# Patient Record
(demographics unavailable — no encounter records)

---

## 2024-10-11 NOTE — HISTORY OF PRESENT ILLNESS
[FreeTextEntry1] : NPV - spot [de-identified] : 39 y/o M here for skin check #itchy scalp #recurrent jock itch - pt uses OTC antifungal spray with resolution, notes in hot months, not active today hx of blistering sunburns when younger uses sunscreen when he goes out no PMH or FH skin cancer social hx: works for Dr[employer]

## 2024-10-11 NOTE — PHYSICAL EXAM
[FreeTextEntry3] : PE:  General: well-appearing, alert, in no acute distress  Full body skin exam performed examining scalp, head, face, ears, eyes, mouth, neck, chest, back, abdomen, axilla, buttock, b/l arms, b/l forearms, b/l hands, b/l fingernails, b/l thighs, b/l legs, b/l feet, b/l toenails. Groin and genitalia deferred per patient. Pertinent findings include: Fairly uniform and regular brown macules and papules on the trunk and extremities. dermoscopy with benign features Scattered well demarcated stuck on appearing brown plaques on the trunk and extremities. mild scale in scalp pt deferred groin exam

## 2024-10-11 NOTE — ASSESSMENT
[FreeTextEntry1] : #Seborrheic dermatitis, scalp, chronic, flaring We have discussed the nature and course of this condition. We have discussed treatment options, expectations from treatment, and associated side effects of topical therapies. - start ketoconazole 2% shampoo 2-3x/week rinse after 5-10 min  #dermatitis, not active today, groin ddx: likely irritant dermatitis vs less likely tinea cruris - may use zinc oxide or otc antifungal powder to keep area dry - may also use lamisil powder - can rtc if flaring  #Lentigines #Sks #Multiple melanocytic nevi, benign - education, counseling, reassurance - ABCDEs of melanoma reviewed, rtc sooner if changing  FBSE/Healthcare maintenance -Sun protection was discussed. The proper use of broad-spectrum UVA/UVB sunscreens with SPF 30 or greater was reviewed and the need for re-application after swimming or sweating or 2-3 hours was emphasized. We talked about judicious use of clothing and avoidance of peak periods of sun exposure. I made the patient aware of the need for year-round protection. ABCDEs of melanoma were also reviewed. -Self-skin checks were also reviewed, rtc sooner if changed noted -Counseled patient to monitor for changes - FBSE completed today, no lesions concerning for malignancy. Next FBSE due 1 year  RTC 1 year or sooner prn